# Patient Record
(demographics unavailable — no encounter records)

---

## 2018-06-18 NOTE — ER DOCUMENT REPORT
HPI





- HPI


Pain Level: 4


Notes: 





Patient is a 7 month 24-day-old female who presents to the ED with parents 

complaining of loose stool and diarrhea over the last 12 hours with an 

occasional fever.  Parents state that she had 4 bowel movements last night when 

normally she only has to throughout the day.  She is still able to eat and drink

, but does have a decreased p.o. intake.  Father states that her brother had 

the same symptoms 2 days ago, and that he is starting to have a stomachache as 

well.  Denies any drug allergies or other significant past medical history.  

Immunizations reported to be up-to-date.  Denies any ear pulling, eye redness, 

nasal yaya/discharge, trouble swallowing, excessive drooling, hoarseness, cough

, wheeze, sob, dyspnea, syncope, abd pain, n/v/c, malodorous urine, hematuria, 

urinary retention, joint pain, or rash.





- ROS


Systems Reviewed and Negative: Yes All other systems reviewed and negative





- CONSTITUTIONAL


Constitutional: REPORTS: Fever.  DENIES: Chills





Past Medical History





- Social History


Smoking Status: Never Smoker


Family History: Reviewed & Not Pertinent


Patient has suicidal ideation: No


Patient has homicidal ideation: No


Renal/ Medical History: Denies: Hx Peritoneal Dialysis





Vertical Provider Document





- CONSTITUTIONAL


Agree With Documented VS: No - RR 24 during my exam and no distress.


Notes: 





PHYSICAL EXAMINATION:





GENERAL: Well-appearing, well-nourished child in no acute distress.  Alert, 

cooperative, happy, comfortable, smiling, moves all extremities w/o difficulty 

or discomfort noted.





HEAD: Atraumatic, normocephalic.





EYES: Pupils equal round and reactive to light, extraocular movements intact, 

sclera anicteric, conjunctiva are normal. Tears noted





ENT: EAC's clear bilaterally.  TM's are pearly gray with a good light reflex, 

no erythema, perforation, or fluid.  Nares patent with clear discharge, 

oropharynx clear without exudates.  No tonsillar hypertrophy or erythema.  

Moist mucous membranes.  No sinus tenderness.  uvula midline.  No palatine 

shift. No airway compromise. No obvious enlarged epiglottis noted.  No nasal 

flaring.





NECK: Normal range of motion, supple without lymphadenopathy.  No rigidity/

meningismus. 





LUNGS: Breath sounds clear to auscultation bilaterally and equal.  No wheezes 

rales or rhonchi. No retractions





HEART: Regular rate and rhythm without murmurs





ABDOMEN: Soft, nontender, nondistended abdomen.  No guarding, no rebound.  No 

masses appreciated.





Musculoskeletal: Normal range of motion, no pitting or edema.  No cyanosis.





NEUROLOGICAL: Normal speech, normal gait exam for age. 





PSYCH: Normal mood, normal affect.





SKIN: Warm, Dry, normal turgor, no rashes or lesions noted





Course





- Re-evaluation


Re-evalutation: 





06/18/18 09:23


Patient is an afebrile, well-hydrated, 7 month 24-day-old female who presents 

to the ED with diarrhea and fever, suspect viral illness.  Vitals are 

acceptable.  PE is otherwise unremarkable.  Patient does not have any 

significant tachycardia, tachypnea (RR 24), or hypoxia.  Patient is nontoxic-

appearing.  Her lungs are clear to auscultation and abdomen is soft and 

nontender.  Patient was given Tylenol today.  Patient is tolerating p.o. 

without any difficulties with a p.o. challenge.  No labs or imaging warranted 

at this time based on H&P.  Low suspicion for any sepsis, meningitis, severe 

dehydration, respiratory compromise, acute abdomen, or other systemic emergent 

condition at this time.  Mother is aware that condition can change from initial 

presentation and she needs to monitor symptoms closely and seek medical 

attention with any acute changes.  Conservative measures for symptoms.  Recheck 

with the pediatrician in 1-2 days.  Return to the ED with any worsening/

concerning symptoms otherwise as reviewed in discharge.  Parents are in 

agreement.





- Vital Signs


Vital signs: 


 











Temp Pulse Resp BP Pulse Ox


 


 101.5 F H  144 H  44 H  112/66   98 


 


 06/18/18 08:38  06/18/18 08:38  06/18/18 08:38  06/18/18 08:38  06/18/18 08:38














Discharge





- Discharge


Clinical Impression: 


Fever


Qualifiers:


 Fever type: unspecified Qualified Code(s): R50.9 - Fever, unspecified





Diarrhea


Qualifiers:


 Diarrhea type: unspecified type Qualified Code(s): R19.7 - Diarrhea, 

unspecified





Condition: Stable


Disposition: HOME, SELF-CARE


Instructions:  Fever ([facility]), Acetaminophen, Viral Syndrome ([facility]), Pediatric 

Hydration ([facility]), Pediatric Ibuprofen ([facility]), Pediatric Diarrhea ([facility])


Additional Instructions: 


Maintain adequate fluid intake


Take medication as directed


Nasal suction if needed


Humidified air may help


Tylenol/ibuprofen as needed alternating every 3 hours for fever


Monitor urinary output


F/u: with Pediatrician/PCM in 1-2days for a recheck


Return to the ED with any development of fever or worsening symptoms of cough, 

shortness of breath, trouble breathing, wheezing, chest pain, syncope, 

abdominal pain, n/v/d, trouble swallowing, drooling, changes in behavior/

mentation, or any other worsening/concerning symptoms otherwise as needed.


Forms:  Parent Work Note


Referrals: 


[facility] [Provider Group] - Follow up tomorrow

## 2018-12-12 NOTE — ER DOCUMENT REPORT
HPI





- HPI


Patient complains to provider of: cough, rapid breathing


Time Seen by Provider: 12/12/18 06:12


Pain Level: Denies


Context: 


Patient is a 1 year 1-month-old female that comes to the emergency department 

for chief complaint of waking up tonight with a barky cough and rapid 

breathing.  Parents state the patient is actually improved since arrival to the 

emergency department.  No fever.  Sick siblings with viral symptoms and 

possible pneumonia per parents.  Patient is vaccinated.  No daily medications, 

no medical history of reported, no history of reactive airway or 

hospitalizations.








Past Medical History





- General


Information source: Parent





- Social History


Smoking Status: Never Smoker


Frequency of alcohol use: None


Drug Abuse: None


Lives with: Family


Family History: Reviewed & Not Pertinent





- Medical History


Medical History: Negative


Renal/ Medical History: Denies: Hx Peritoneal Dialysis


Surgical Hx: Negative





- Immunizations


Immunizations up to date: Yes


Hx Diphtheria, Pertussis, Tetanus Vaccination: Yes





Vertical Provider Document





- CONSTITUTIONAL


General Appearance: WD/WN, No Apparent Distress





- INFECTION CONTROL


TRAVEL OUTSIDE OF THE [place] IN LAST 30 DAYS: No





- HEENT


HEENT: Atraumatic, Normal ENT Exam, Normocephalic





- NECK


Neck: Normal Inspection





- RESPIRATORY


Respiratory: Breath Sounds Normal, No Respiratory Distress, Other - Patient 

does have a barky croup-like cough when she becomes agitated and cries, however 

there is no wheezing, no tachypnea, no respiratory distress.  Easily consoled 

and symptoms resolved.





- CARDIOVASCULAR


Cardiovascular: Regular Rate, Regular Rhythm





- GI/ABDOMEN


Gastrointestinal: Abdomen Soft, Abdomen Non-Tender





- BACK


Back: Normal Inspection





- MUSCULOSKELETAL/EXTREMETIES


Musculoskeletal/Extremeties: MAEW, FROM, Non-Tender





- NEURO


Level of Consciousness: Awake, Alert, Appropriate





- DERM


Integumentary: Warm, Dry, No Rash





Course





- Re-evaluation


Re-evalutation: 


Patient with obvious croup cough, however she is alert, smiling, cooperative.  

No tachypnea, no retractions, no hypoxia.  No fever.  Unremarkable examination 

otherwise.  Given dexamethasone, discussed close pediatric follow-up, monitoring

, and return precautions in detail with parents.  They state understanding and 

agreement.





- Vital Signs


Vital signs: 


 











Temp Pulse Resp BP Pulse Ox


 


 97.9 F   161 H  32      100 


 


 12/12/18 05:59  12/12/18 05:59  12/12/18 05:59     12/12/18 05:59














Discharge





- Discharge


Clinical Impression: 


 Cough, Croup





Condition: Stable


Disposition: HOME, SELF-CARE


Instructions:  Acetaminophen, Croup ([facility])


Additional Instructions: 


Her evaluation is consistent with croup, a viral upper respiratory infection.


She has been treated for this initially, you may need to give Tylenol or 

ibuprofen for fever.


You can use a NoseFrida for suctioning congestion.


Follow-up with pediatrics within the next 1-2 days.


Return to the emergency department for any concerning or worsening symptoms 

including rapid or labored breathing, if she stops responding to you normally, 

no urination for 8 hours or more, or any other concerning or worsening symptoms.


Forms:  Parent Work Note


Referrals: 


SONALI LINARES MD [Primary Care Provider] - Follow up as needed

## 2018-12-15 NOTE — RADIOLOGY REPORT (SQ)
EXAM DESCRIPTION:  CHEST 2 VIEWS



COMPLETED DATE/TIME:  12/15/2018 1:06 pm



REASON FOR STUDY:  cough



COMPARISON:  None.



EXAM PARAMETERS:  NUMBER OF VIEWS: two views

TECHNIQUE: Digital Frontal and Lateral radiographic views of the chest acquired.

RADIATION DOSE: NA

LIMITATIONS: none



FINDINGS:  LUNGS AND PLEURA: No consolidation, pneumothorax or pleural effusion.

MEDIASTINUM AND HILAR STRUCTURES: No masses or contour abnormalities.

HEART AND VASCULAR STRUCTURES: Heart normal size.  No evidence for failure.

BONES: No acute findings.

HARDWARE: None in the chest.



IMPRESSION:  NO ACUTE RADIOGRAPHIC FINDING IN THE CHEST.



TECHNICAL DOCUMENTATION:  JOB ID:  [id number]

[id number]

 2011 [employer]- All Rights Reserved



Reading location - IP/workstation name: [email or web address]

## 2020-06-14 NOTE — ER DOCUMENT REPORT
ED Medical Screen (RME)





- General


Chief Complaint: Productive Cough


Stated Complaint: COUGH,VOMITING,FEVER


Time Seen by Provider: 12/15/18 12:19


Mode of Arrival: Carried


Information source: Parent


TRAVEL OUTSIDE OF THE [place] IN LAST 30 DAYS: No





- HPI


Patient complains to provider of: cough; fever


Onset: Other - mom states toddler was here 2 days ago with croup and was told 

to RTED with fever or vomiting.  She has haad both this am





- Related Data


Allergies/Adverse Reactions: 


 





No Known Allergies Allergy (Verified 12/15/18 12:06)


 











Past Medical History


Renal/ Medical History: Denies: Hx Peritoneal Dialysis





- Immunizations


Immunizations up to date: Yes


Hx Diphtheria, Pertussis, Tetanus Vaccination: Yes





Physical Exam





- Vital signs


Vitals: 





 











Temp Pulse Resp Pulse Ox


 


 100.5 F H  167 H  32   100 


 


 12/15/18 12:16  12/15/18 12:16  12/15/18 12:16  12/15/18 12:16














Course





- Vital Signs


Vital signs: 





 











Temp Pulse Resp BP Pulse Ox


 


 100.5 F H  167 H  32      100 


 


 12/15/18 12:16  12/15/18 12:16  12/15/18 12:16     12/15/18 12:16














Doctor's Discharge





- Discharge


Referrals: 


SONALI LINARES MD [Primary Care Provider] - Follow up as needed
HPI





- HPI


Time Seen by Provider: 12/15/18 12:19


Pain Level: 3


Context: 





Patient is a 1 year 1 month female presents the emergency department with 

complaints of cough and fever.  She was seen in the emergency department and 

diagnosed with croup 3 days ago.  She was given oral steroids for home.  Her 

parents are at bedside and they said that she is not getting any better.  She 

is up-to-date on her immunizations and she has received her flu shot this year.


Associated Symptoms: Productive cough, Vomiting





- EENT


EENT: REPORTS: Ear Pain - Right.  DENIES: Sore Throat





- RESPIRATORY


Respiratory: REPORTS: Coughing





- GASTROINTESTINAL


Gastrointestinal: REPORTS: Patient vomiting





- DERM


Skin Color: Pink, Flushed





Past Medical History





- General


Information source: Parent





- Social History


Smoking Status: Never Smoker


Chew tobacco use (# tins/day): No


Frequency of alcohol use: None


Drug Abuse: None


Family History: Reviewed & Not Pertinent


Patient has suicidal ideation: No


Patient has homicidal ideation: No


Renal/ Medical History: Denies: Hx Peritoneal Dialysis





- Immunizations


Immunizations up to date: Yes


Hx Diphtheria, Pertussis, Tetanus Vaccination: Yes





[facility] Provider Document





- INFECTION CONTROL


TRAVEL OUTSIDE OF THE [place] IN LAST 30 DAYS: No





- HEENT


HEENT: Atraumatic, Normocephalic, PERRLA, Tympanic Membrane Red - Right


Notes: 





Rhinorrhea noted.





- NECK


Neck: Normal Inspection





- RESPIRATORY


Respiratory: Breath Sounds Normal, No Respiratory Distress





- CARDIOVASCULAR


Cardiovascular: Regular Rate, Regular Rhythm





- GI/ABDOMEN


Gastrointestinal: Abdomen Soft





- NEURO


Level of Consciousness: Awake, Alert





- DERM


Integumentary: Warm, Dry





Course





- Re-evaluation


Re-evalutation: 





12/15/18 14:35


Patient does have a right acute otitis media on exam.  She is not tachypneic.  

Her mood is appropriate to the situation.  She does have rhinorrhea.


12/15/18 16:00


Patient does have RSV, no pneumonia noted in the chest x-ray.  She does have 

right acute otitis media.  She will be treated with amoxicillin at home.  

Verbal discharge instructions were given to the patient parents, they 

verbalized understanding, she is stable for discharge.


Patient's heart rate was 115 time of my re-assessment.  She appears she is 

doing better.





- Vital Signs


Vital signs: 


 











Temp Pulse Resp BP Pulse Ox


 


 100.5 F H  167 H  32      100 


 


 12/15/18 12:16  12/15/18 12:16  12/15/18 12:16     12/15/18 12:16














Discharge





- Discharge


Clinical Impression: 


 RSV (respiratory syncytial virus infection)





Acute otitis media


Qualifiers:


 Otitis media type: suppurative Laterality: right Recurrence: non-recurrent 

Spontaneous tympanic membrane rupture: without spontaneous rupture Qualified 

Code(s): H66.001 - Acute suppurative otitis media without spontaneous rupture 

of ear drum, right ear





Condition: Stable


Disposition: HOME, SELF-CARE


Additional Instructions: 


Your daughter was seen today in the emergency department for a fever and 

vomiting.  She does have RSV, a virus that will go away on its own.  Make sure 

you do aggressive nasal suctioning to help with her symptoms.  Make sure she 

drinks any fluid.  She also has an ear infection to the right ear.  She has 

been prescribed antibiotics.  Make sure she finishes all her antibiotics, even 

if she starts to feel better.  If she continues to have a fever not controlled 

by Motrin Tylenol, has increased work of breathing, or has any symptoms that 

are worrisome to you, please return to the emergency department.


Prescriptions: 


Amoxicillin Trihydrate [Amoxil 400 mg/5 mL Suspension] 5.5 ml PO BID 10 Days #

110 ml


Referrals: 


SONALI LINARES MD [Primary Care Provider] - Follow up as needed
proximal